# Patient Record
Sex: MALE | Race: WHITE | Employment: UNEMPLOYED | URBAN - METROPOLITAN AREA
[De-identification: names, ages, dates, MRNs, and addresses within clinical notes are randomized per-mention and may not be internally consistent; named-entity substitution may affect disease eponyms.]

---

## 2017-06-25 ENCOUNTER — APPOINTMENT (OUTPATIENT)
Dept: GENERAL RADIOLOGY | Age: 37
End: 2017-06-25
Attending: EMERGENCY MEDICINE
Payer: SUBSIDIZED

## 2017-06-25 ENCOUNTER — HOSPITAL ENCOUNTER (EMERGENCY)
Age: 37
Discharge: HOME | End: 2017-06-25
Attending: EMERGENCY MEDICINE
Payer: SUBSIDIZED

## 2017-06-25 ENCOUNTER — APPOINTMENT (OUTPATIENT)
Dept: CT IMAGING | Age: 37
End: 2017-06-25
Attending: EMERGENCY MEDICINE
Payer: SUBSIDIZED

## 2017-06-25 VITALS
RESPIRATION RATE: 20 BRPM | DIASTOLIC BLOOD PRESSURE: 78 MMHG | TEMPERATURE: 98.8 F | SYSTOLIC BLOOD PRESSURE: 167 MMHG | HEART RATE: 100 BPM | BODY MASS INDEX: 21 KG/M2 | OXYGEN SATURATION: 96 % | HEIGHT: 71 IN | WEIGHT: 150 LBS

## 2017-06-25 DIAGNOSIS — S20.212A RIB CONTUSION, LEFT, INITIAL ENCOUNTER: ICD-10-CM

## 2017-06-25 DIAGNOSIS — S00.81XA FACIAL ABRASION, INITIAL ENCOUNTER: ICD-10-CM

## 2017-06-25 DIAGNOSIS — S00.83XA FACIAL CONTUSION, INITIAL ENCOUNTER: ICD-10-CM
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
## 2017-06-25 PROCEDURE — 99283 EMERGENCY DEPT VISIT LOW MDM: CPT

## 2017-06-25 PROCEDURE — 70486 CT MAXILLOFACIAL W/O DYE: CPT

## 2017-06-25 PROCEDURE — 74011250637 HC RX REV CODE- 250/637: Performed by: EMERGENCY MEDICINE

## 2017-06-25 PROCEDURE — 71101 X-RAY EXAM UNILAT RIBS/CHEST: CPT

## 2017-06-25 PROCEDURE — 70450 CT HEAD/BRAIN W/O DYE: CPT

## 2017-06-25 RX ORDER — HYDROCODONE BITARTRATE AND ACETAMINOPHEN 5; 325 MG/1; MG/1
1 TABLET ORAL
Status: COMPLETED | OUTPATIENT
Start: 2017-06-25 | End: 2017-06-25

## 2017-06-25 RX ADMIN — HYDROCODONE BITARTRATE AND ACETAMINOPHEN 1 TABLET: 5; 325 TABLET ORAL at 22:33
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
## 2018-01-04 ENCOUNTER — APPOINTMENT (OUTPATIENT)
Dept: CT IMAGING | Age: 38
End: 2018-01-04
Attending: NURSE PRACTITIONER
Payer: SUBSIDIZED

## 2018-01-04 ENCOUNTER — HOSPITAL ENCOUNTER (INPATIENT)
Age: 38
LOS: 1 days | Discharge: HOME | DRG: 086 | End: 2018-01-05
Attending: FAMILY MEDICINE | Admitting: INTERNAL MEDICINE
Payer: SUBSIDIZED

## 2018-01-04 ENCOUNTER — APPOINTMENT (OUTPATIENT)
Dept: GENERAL RADIOLOGY | Age: 38
End: 2018-01-04
Attending: NURSE PRACTITIONER
Payer: SUBSIDIZED

## 2018-01-04 ENCOUNTER — HOSPITAL ENCOUNTER (EMERGENCY)
Age: 38
Discharge: OTHER INSTITUTION | End: 2018-01-04
Attending: EMERGENCY MEDICINE | Admitting: EMERGENCY MEDICINE
Payer: SUBSIDIZED

## 2018-01-04 VITALS
DIASTOLIC BLOOD PRESSURE: 77 MMHG | SYSTOLIC BLOOD PRESSURE: 147 MMHG | HEART RATE: 77 BPM | WEIGHT: 155 LBS | TEMPERATURE: 97.9 F | OXYGEN SATURATION: 94 % | HEIGHT: 71 IN | RESPIRATION RATE: 14 BRPM | BODY MASS INDEX: 21.7 KG/M2

## 2018-01-04 DIAGNOSIS — S06.319A INTRAPARENCHYMAL HEMATOMA OF BRAIN, RIGHT, WITH LOSS OF CONSCIOUSNESS, INITIAL ENCOUNTER (HCC): Primary | ICD-10-CM

## 2018-01-04 DIAGNOSIS — S06.350A TRAUMATIC HEMORRHAGE OF LEFT CEREBRUM WITHOUT LOSS OF CONSCIOUSNESS, INITIAL ENCOUNTER (HCC): Primary | ICD-10-CM

## 2018-01-04 PROCEDURE — 74011250636 HC RX REV CODE- 250/636: Performed by: NURSE PRACTITIONER

## 2018-01-04 PROCEDURE — 65610000006 HC RM INTENSIVE CARE

## 2018-01-04 PROCEDURE — 71045 X-RAY EXAM CHEST 1 VIEW: CPT

## 2018-01-04 PROCEDURE — 77010033678 HC OXYGEN DAILY

## 2018-01-04 PROCEDURE — 99284 EMERGENCY DEPT VISIT MOD MDM: CPT

## 2018-01-04 PROCEDURE — 70450 CT HEAD/BRAIN W/O DYE: CPT

## 2018-01-04 PROCEDURE — 96374 THER/PROPH/DIAG INJ IV PUSH: CPT

## 2018-01-04 PROCEDURE — L3650 SO 8 ABD RESTRAINT PRE OTS: HCPCS

## 2018-01-04 PROCEDURE — 73030 X-RAY EXAM OF SHOULDER: CPT

## 2018-01-04 RX ORDER — MORPHINE SULFATE 2 MG/ML
4 INJECTION, SOLUTION INTRAMUSCULAR; INTRAVENOUS ONCE
Status: COMPLETED | OUTPATIENT
Start: 2018-01-04 | End: 2018-01-04

## 2018-01-04 RX ADMIN — MORPHINE SULFATE 4 MG: 2 INJECTION, SOLUTION INTRAMUSCULAR; INTRAVENOUS at 18:54

## 2018-01-04 NOTE — ED PROVIDER NOTES
HPI Comments: 42 y.o. male with no significant past medical history who presents from home for evaluation s/p fall. Pt was walking in his backyard when he slipped and fell. He attempted to brace himself with a small tree which was unable to support his weight causing him to fall head first hitting his head on the tree. He is experiencing pain to the R-side of his head and neck and extreme pain to his L-shoulder. He took some Aleve PM and went to sleep but woke up with more severe pain. He also reports that he has shooting back pain with pleuritic movement. He does not think he experienced LOC but he did hit his head and \"laid down for awhile\" after the fall. Pt denies use of blood thinners. Pt denies SOB. There are no other acute medical concerns at this time. Social hx: current everyday tobacco smoker (0.25 packs/day); no EtOH use; no illicit drug use    History reviewed. No pertinent past medical history. Past Surgical History:  No date: HX HEENT      Comment: tooth extraction    Note written by Isis Siddiqui, as dictated by Malathi Orosco NP 4:46 PM    The history is provided by the patient. No  was used. History reviewed. No pertinent past medical history. Past Surgical History:   Procedure Laterality Date    HX HEENT      tooth extraction         History reviewed. No pertinent family history. Social History     Social History    Marital status: SINGLE     Spouse name: N/A    Number of children: N/A    Years of education: N/A     Occupational History    Not on file. Social History Main Topics    Smoking status: Current Every Day Smoker     Packs/day: 0.50    Smokeless tobacco: Never Used    Alcohol use Yes    Drug use: No    Sexual activity: Not on file     Other Topics Concern    Not on file     Social History Narrative         ALLERGIES: Review of patient's allergies indicates no known allergies. Review of Systems   Constitutional: Negative. Negative for activity change, appetite change, chills, fatigue, fever and unexpected weight change. HENT: Negative. Negative for congestion, ear discharge, ear pain, hearing loss, nosebleeds, rhinorrhea, sinus pain, sinus pressure, sore throat and trouble swallowing. Eyes: Negative for photophobia, pain, redness, itching and visual disturbance. Respiratory: Negative. Negative for cough, chest tightness and shortness of breath. Cardiovascular: Negative. Negative for chest pain and palpitations. Gastrointestinal: Negative. Negative for abdominal distention, abdominal pain, blood in stool, nausea and vomiting. Endocrine: Negative. Genitourinary: Negative for difficulty urinating, dysuria, frequency, hematuria and urgency. Musculoskeletal: Positive for arthralgias (R-shoulder pain) and neck pain. Negative for back pain, myalgias and neck stiffness. Skin: Negative. Negative for color change, pallor, rash and wound. Allergic/Immunologic: Negative. Neurological: Positive for headaches. Negative for dizziness, syncope, weakness and numbness. Hematological: Negative. Does not bruise/bleed easily. Psychiatric/Behavioral: Negative. Negative for behavioral problems. The patient is not nervous/anxious. All other systems reviewed and are negative. Vitals:    01/04/18 1627   BP: 147/77   Pulse: (!) 124   Resp: 24   Temp: 97.9 °F (36.6 °C)   SpO2: 100%   Weight: 70.3 kg (155 lb)   Height: 5' 11\" (1.803 m)            Physical Exam   Constitutional: He is oriented to person, place, and time. He appears well-developed and well-nourished. He appears distressed (in pain). HENT:   Head: Normocephalic and atraumatic. Right Ear: External ear normal.   Left Ear: External ear normal.   Nose: Nose normal.   Mouth/Throat: Oropharynx is clear and moist.   Eyes: Conjunctivae and EOM are normal. Pupils are equal, round, and reactive to light. Right eye exhibits no discharge.  Left eye exhibits no discharge. Neck: Normal range of motion. Neck supple. No JVD present. No tracheal deviation present. Cardiovascular: Regular rhythm, normal heart sounds and intact distal pulses. Exam reveals no gallop. No murmur heard. Tachycardia 120's   Pulmonary/Chest: Effort normal and breath sounds normal. No respiratory distress. He has no wheezes. He has no rales. He exhibits no tenderness. Abdominal: Soft. Bowel sounds are normal. He exhibits no distension. There is no tenderness. There is no rebound and no guarding. Genitourinary:   Genitourinary Comments: Negative     Musculoskeletal: Normal range of motion. He exhibits tenderness (left shoulder). He exhibits no edema. Neurological: He is alert and oriented to person, place, and time. Skin: Skin is warm and dry. No rash noted. No erythema. No pallor. Psychiatric: His behavior is normal. Judgment and thought content normal.   Patient with anxiety   Nursing note and vitals reviewed. MDM  Number of Diagnoses or Management Options  Intraparenchymal hematoma of brain, right, with loss of consciousness, initial encounter [facility]): new and requires workup  Diagnosis management comments: Plan:  Transfer to [facility] for observation. Patient with IPH post fall. Amount and/or Complexity of Data Reviewed  Tests in the radiology section of CPT®: ordered and reviewed      ED Course   1720: Reviewed radiology. CT head reviewed with Dr. Donato Ferrara. 1800: Spoke with Dr. Rukhsana Nelson of neurosurgery [facility]. Patient needs to be admitted to the hospital overnight, repeat head CT in AM. Will contact hospitalist.   Angelita Melton: Awaiting call for hospitalist.   Jenny Garcia: Spoke with Dr. Saralyn Cheadle regarding admission to hospital.  Dr. Saralyn Cheadle to accept patient.      Procedures

## 2018-01-04 NOTE — ED TRIAGE NOTES
Pt c/o falling headfirst into a tree this morning. Pt states hit his left shoulder. No LOC. Pt having extreme pain in shoulder and states has a bump. States it hurts to breath in his back.  2+ left radial pulse

## 2018-01-04 NOTE — IP AVS SNAPSHOT
[address] 
[phone] Patient: Anton Yoder MRN: [id number] :1980 About your hospitalization You were admitted on:  2018 You last received care in the:  [address] [place] [address] You were discharged on:  2018 Why you were hospitalized Your primary diagnosis was:  Traumatic Hemorrhage Of Cerebrum (Hcc) Your diagnoses also included:  Ich (Intracerebral Hemorrhage) (Hcc) Follow-up Information Follow up With Details Comments Contact Info Michael Johns MD (Jody) In 2 weeks  [address] [facility] [address] 
[phone] None   None (395) Patient stated that they have no PCP Hazel) Jeff Johns MD In 2 weeks f/u for left [facility] joint separation [address] [facility] [address] 
[phone] Discharge Orders None A check miguel indicates which time of day the medication should be taken. My Medications START taking these medications Instructions Each Dose to Equal  
 Morning Noon Evening Bedtime  
 nicotine 21 mg/24 hr  
Commonly known as:  Herlene Bilberry Start taking on:  2018 Your last dose was: Your next dose is:    
   
   
 1 Patch by TransDERmal route every twenty-four (24) hours for 30 days. 1 Patch  
    
   
   
   
  
 oxyCODONE-acetaminophen 5-325 mg per tablet Commonly known as:  PERCOCET Your last dose was: Your next dose is: Take 1 Tab by mouth every four (4) hours as needed for Pain. Max Daily Amount: 6 Tabs. 1 Tab Where to Get Your Medications Information on where to get these meds will be given to you by the nurse or doctor. ! Ask your nurse or doctor about these medications  
  nicotine 21 mg/24 hr  
 oxyCODONE-acetaminophen 5-325 mg per tablet Discharge Instructions Shoulder Separation: Care Instructions Your Care Instructions A shoulder separation is a tearing of the ligaments that connect two bones of the shoulder-the collarbone (clavicle) and the end of the shoulder blade (acromion). The ligaments can be partially or completely torn. This is usually caused by a blow to the top of the shoulder or a fall onto an outstretched arm. Shoulder injuries can be slow to heal, but with time and effort, your shoulder should get better. Physical therapy can help you regain strength, motion, and flexibility in your shoulder. Follow-up care is a key part of your treatment and safety. Be sure to make and go to all appointments, and call your doctor if you are having problems. It's also a good idea to know your test results and keep a list of the medicines you take. How can you care for yourself at home? · If your doctor put your arm in a sling, wear the sling as directed. Do not take it off before your doctor tells you to. · Take pain medicines exactly as directed. ¨ If the doctor gave you a prescription medicine for pain, take it as prescribed. ¨ If you are not taking a prescription pain medicine, ask your doctor if you can take an over-the-counter medicine. · Rest your shoulder as much as you can. · Put ice or a cold pack on your shoulder for 10 to 20 minutes at a time. Try to do this every 1 to 2 hours for the next 3 days (when you are awake) or until the swelling goes down. Put a thin cloth between the ice and your skin. · You may use warm packs after the first 3 days for 15 to 20 minutes at a time to ease pain. · Do not do anything that makes pain worse. · Go to all follow-up appointments. You and your doctor will decide if you need further treatment, including surgery. You and your doctor will also decide when to begin physical therapy, if it is needed. When should you call for help? Call your doctor now or seek immediate medical care if: 
? · Your pain gets a lot worse. ? · You cannot move your arm. ? · You have new weakness, numbness, or tingling in your hand or arm. ? · Your arm or hand is cool or pale or changes color. ? · Your sling feels too tight, and you cannot loosen it. ? Watch closely for changes in your health, and be sure to contact your doctor if: 
? · You have new or increased swelling in your arm. ? · You have new pain that develops in another area of your arm. For example, you have pain in your hand or elbow. ? · You do not get better as expected. Where can you learn more? Go to http://marlene-maira.info/. Enter S051 in the search box to learn more about \"Shoulder Separation: Care Instructions. \" Current as of: March 21, 2017 Content Version: 11.4 © 2881-6813 [employer]. Care instructions adapted under license by [employer] (which disclaims liability or warranty for this information). If you have questions about a medical condition or this instruction, always ask your healthcare professional. [place] [address] any warranty or liability for your use of this information. [facility] Announcement We are excited to announce that we are making your provider's discharge notes available to you in [facility]. You will see these notes when they are completed and signed by the physician that discharged you from your recent hospital stay. If you have any questions or concerns about any information you see in [facility], please call the Health Information Department where you were seen or reach out to your Primary Care Provider for more information about your plan of care. Introducing [facility]! Coreen Ross introduces [facility] patient portal. Now you can access parts of your medical record, email your doctor's office, and request medication refills online. 1. In your internet browser, go to [email or web address] 2. Click on the First Time User? Click Here link in the Sign In box. You will see the New Member Sign Up page. 3. Enter your [employer] Access Code exactly as it appears below. You will not need to use this code after youve completed the sign-up process. If you do not sign up before the expiration date, you must request a new code. · [employer] Access Code: [email or web address] Expires: 4/4/2018  6:42 PM 
 
4. Enter the last four digits of your Social Security Number (xxxx) and Date of Birth (mm/dd/yyyy) as indicated and click Submit. You will be taken to the next sign-up page. 5. Create a Imaginatikt ID. This will be your [employer] login ID and cannot be changed, so think of one that is secure and easy to remember. 6. Create a [employer] password. You can change your password at any time. 7. Enter your Password Reset Question and Answer. This can be used at a later time if you forget your password. 8. Enter your e-mail address. You will receive e-mail notification when new information is available in [address] 9. Click Sign Up. You can now view and download portions of your medical record. 10. Click the Download Summary menu link to download a portable copy of your medical information. If you have questions, please visit the Frequently Asked Questions section of the [employer] website. Remember, [employer] is NOT to be used for urgent needs. For medical emergencies, dial 911. Now available from your iPhone and Android! Providers Seen During Your Hospitalization Provider Specialty Primary office phone Rachael Don MD Hospitalist [phone] Patrica Porter MD Family Practice [phone] Your Primary Care Physician (PCP) Primary Care Physician Office Phone Office Fax NONE ** None ** ** None ** You are allergic to the following No active allergies Recent Documentation Height Weight BMI Smoking Status 1.803 m 72 kg 22.14 kg/m2 Current Every Day Smoker Emergency Contacts Name Discharge Info Relation Home Work Mobile [facility] DISCHARGE CAREGIVER [3] Sister [23] [phone] Patient Belongings The following personal items are in your possession at time of discharge: 
  Dental Appliances: None  Visual Aid: None      Home Medications: None   Jewelry: None  Clothing: None    Other Valuables: None Please provide this summary of care documentation to your next provider. Signatures-by signing, you are acknowledging that this After Visit Summary has been reviewed with you and you have received a copy. Patient Signature:  ____________________________________________________________ Date:  ____________________________________________________________  
  
[facility] Provider Signature:  ____________________________________________________________ Date:  ____________________________________________________________

## 2018-01-05 ENCOUNTER — APPOINTMENT (OUTPATIENT)
Dept: CT IMAGING | Age: 38
DRG: 086 | End: 2018-01-05
Attending: INTERNAL MEDICINE
Payer: SUBSIDIZED

## 2018-01-05 VITALS
HEIGHT: 71 IN | DIASTOLIC BLOOD PRESSURE: 86 MMHG | SYSTOLIC BLOOD PRESSURE: 136 MMHG | TEMPERATURE: 97.9 F | WEIGHT: 158.73 LBS | OXYGEN SATURATION: 96 % | BODY MASS INDEX: 22.22 KG/M2 | HEART RATE: 94 BPM | RESPIRATION RATE: 17 BRPM

## 2018-01-05 PROBLEM — S06.36AA TRAUMATIC HEMORRHAGE OF CEREBRUM: Status: ACTIVE | Noted: 2018-01-05

## 2018-01-05 PROBLEM — I61.9 ICH (INTRACEREBRAL HEMORRHAGE) (HCC): Status: ACTIVE | Noted: 2018-01-05

## 2018-01-05 LAB
ALBUMIN SERPL-MCNC: 4 G/DL (ref 3.5–5)
ALBUMIN/GLOB SERPL: 1.1 {RATIO} (ref 1.1–2.2)
ALP SERPL-CCNC: 41 U/L (ref 45–117)
ALT SERPL-CCNC: 75 U/L (ref 12–78)
AMPHET UR QL SCN: NEGATIVE
ANION GAP SERPL CALC-SCNC: 10 MMOL/L (ref 5–15)
AST SERPL-CCNC: 32 U/L (ref 15–37)
BARBITURATES UR QL SCN: NEGATIVE
BASOPHILS # BLD: 0 K/UL (ref 0–0.1)
BASOPHILS NFR BLD: 0 % (ref 0–1)
BENZODIAZ UR QL: NEGATIVE
BILIRUB SERPL-MCNC: 0.8 MG/DL (ref 0.2–1)
BUN SERPL-MCNC: 13 MG/DL (ref 6–20)
BUN/CREAT SERPL: 14 (ref 12–20)
CALCIUM SERPL-MCNC: 9 MG/DL (ref 8.5–10.1)
CANNABINOIDS UR QL SCN: POSITIVE
CHLORIDE SERPL-SCNC: 104 MMOL/L (ref 97–108)
CO2 SERPL-SCNC: 25 MMOL/L (ref 21–32)
COCAINE UR QL SCN: NEGATIVE
CREAT SERPL-MCNC: 0.94 MG/DL (ref 0.7–1.3)
DRUG SCRN COMMENT,DRGCM: ABNORMAL
EOSINOPHIL # BLD: 0.1 K/UL (ref 0–0.4)
EOSINOPHIL NFR BLD: 1 % (ref 0–7)
ERYTHROCYTE [DISTWIDTH] IN BLOOD BY AUTOMATED COUNT: 12.4 % (ref 11.5–14.5)
GLOBULIN SER CALC-MCNC: 3.6 G/DL (ref 2–4)
GLUCOSE SERPL-MCNC: 91 MG/DL (ref 65–100)
HCT VFR BLD AUTO: 40.4 % (ref 36.6–50.3)
HGB BLD-MCNC: 14.2 G/DL (ref 12.1–17)
LYMPHOCYTES # BLD: 3 K/UL (ref 0.8–3.5)
LYMPHOCYTES NFR BLD: 44 % (ref 12–49)
MAGNESIUM SERPL-MCNC: 1.9 MG/DL (ref 1.6–2.4)
MCH RBC QN AUTO: 32.5 PG (ref 26–34)
MCHC RBC AUTO-ENTMCNC: 35.1 G/DL (ref 30–36.5)
MCV RBC AUTO: 92.4 FL (ref 80–99)
METHADONE UR QL: NEGATIVE
MONOCYTES # BLD: 0.4 K/UL (ref 0–1)
MONOCYTES NFR BLD: 7 % (ref 5–13)
NEUTS SEG # BLD: 3.3 K/UL (ref 1.8–8)
NEUTS SEG NFR BLD: 48 % (ref 32–75)
OPIATES UR QL: POSITIVE
PCP UR QL: NEGATIVE
PHOSPHATE SERPL-MCNC: 4.9 MG/DL (ref 2.6–4.7)
PLATELET # BLD AUTO: 232 K/UL (ref 150–400)
POTASSIUM SERPL-SCNC: 3.9 MMOL/L (ref 3.5–5.1)
PROT SERPL-MCNC: 7.6 G/DL (ref 6.4–8.2)
RBC # BLD AUTO: 4.37 M/UL (ref 4.1–5.7)
SODIUM SERPL-SCNC: 139 MMOL/L (ref 136–145)
WBC # BLD AUTO: 6.8 K/UL (ref 4.1–11.1)

## 2018-01-05 PROCEDURE — 74011250636 HC RX REV CODE- 250/636: Performed by: INTERNAL MEDICINE

## 2018-01-05 PROCEDURE — 83735 ASSAY OF MAGNESIUM: CPT | Performed by: INTERNAL MEDICINE

## 2018-01-05 PROCEDURE — 84100 ASSAY OF PHOSPHORUS: CPT | Performed by: INTERNAL MEDICINE

## 2018-01-05 PROCEDURE — 74011250637 HC RX REV CODE- 250/637: Performed by: INTERNAL MEDICINE

## 2018-01-05 PROCEDURE — 80053 COMPREHEN METABOLIC PANEL: CPT | Performed by: INTERNAL MEDICINE

## 2018-01-05 PROCEDURE — 36415 COLL VENOUS BLD VENIPUNCTURE: CPT | Performed by: INTERNAL MEDICINE

## 2018-01-05 PROCEDURE — 80307 DRUG TEST PRSMV CHEM ANLYZR: CPT | Performed by: INTERNAL MEDICINE

## 2018-01-05 PROCEDURE — 77010033678 HC OXYGEN DAILY

## 2018-01-05 PROCEDURE — 85025 COMPLETE CBC W/AUTO DIFF WBC: CPT | Performed by: INTERNAL MEDICINE

## 2018-01-05 PROCEDURE — 72125 CT NECK SPINE W/O DYE: CPT

## 2018-01-05 PROCEDURE — 70450 CT HEAD/BRAIN W/O DYE: CPT

## 2018-01-05 RX ORDER — SODIUM CHLORIDE 0.9 % (FLUSH) 0.9 %
5 SYRINGE (ML) INJECTION AS NEEDED
Status: DISCONTINUED | OUTPATIENT
Start: 2018-01-05 | End: 2018-01-05 | Stop reason: HOSPADM

## 2018-01-05 RX ORDER — IBUPROFEN 200 MG
1 TABLET ORAL EVERY 24 HOURS
Status: DISCONTINUED | OUTPATIENT
Start: 2018-01-05 | End: 2018-01-05 | Stop reason: HOSPADM

## 2018-01-05 RX ORDER — DOCUSATE SODIUM 100 MG/1
100 CAPSULE, LIQUID FILLED ORAL 2 TIMES DAILY
Status: DISCONTINUED | OUTPATIENT
Start: 2018-01-05 | End: 2018-01-05 | Stop reason: HOSPADM

## 2018-01-05 RX ORDER — SODIUM CHLORIDE 0.9 % (FLUSH) 0.9 %
SYRINGE (ML) INJECTION
Status: COMPLETED
Start: 2018-01-05 | End: 2018-01-05

## 2018-01-05 RX ORDER — SODIUM CHLORIDE 9 MG/ML
75 INJECTION, SOLUTION INTRAVENOUS CONTINUOUS
Status: DISCONTINUED | OUTPATIENT
Start: 2018-01-05 | End: 2018-01-05 | Stop reason: HOSPADM

## 2018-01-05 RX ORDER — ACETAMINOPHEN 325 MG/1
650 TABLET ORAL
Status: DISCONTINUED | OUTPATIENT
Start: 2018-01-05 | End: 2018-01-05 | Stop reason: HOSPADM

## 2018-01-05 RX ORDER — ONDANSETRON 2 MG/ML
4 INJECTION INTRAMUSCULAR; INTRAVENOUS
Status: DISCONTINUED | OUTPATIENT
Start: 2018-01-05 | End: 2018-01-05 | Stop reason: HOSPADM

## 2018-01-05 RX ORDER — HYDROCODONE BITARTRATE AND ACETAMINOPHEN 5; 325 MG/1; MG/1
1 TABLET ORAL
Status: DISCONTINUED | OUTPATIENT
Start: 2018-01-05 | End: 2018-01-05 | Stop reason: HOSPADM

## 2018-01-05 RX ORDER — HYDROCODONE BITARTRATE AND ACETAMINOPHEN 5; 325 MG/1; MG/1
TABLET ORAL
Status: DISCONTINUED
Start: 2018-01-05 | End: 2018-01-05 | Stop reason: HOSPADM

## 2018-01-05 RX ORDER — IBUPROFEN 200 MG
1 TABLET ORAL EVERY 24 HOURS
Qty: 30 PATCH | Refills: 0 | Status: SHIPPED | OUTPATIENT
Start: 2018-01-06 | End: 2018-02-05

## 2018-01-05 RX ORDER — OXYCODONE AND ACETAMINOPHEN 5; 325 MG/1; MG/1
1 TABLET ORAL
Qty: 20 TAB | Refills: 0 | Status: SHIPPED | OUTPATIENT
Start: 2018-01-05

## 2018-01-05 RX ADMIN — HYDROCODONE BITARTRATE AND ACETAMINOPHEN 1 TABLET: 5; 325 TABLET ORAL at 04:57

## 2018-01-05 RX ADMIN — HYDROCODONE BITARTRATE AND ACETAMINOPHEN 1 TABLET: 5; 325 TABLET ORAL at 01:03

## 2018-01-05 RX ADMIN — Medication 10 ML: at 04:57

## 2018-01-05 RX ADMIN — SODIUM CHLORIDE 75 ML/HR: 900 INJECTION, SOLUTION INTRAVENOUS at 01:03

## 2018-01-05 RX ADMIN — HYDROCODONE BITARTRATE AND ACETAMINOPHEN 1 TABLET: 5; 325 TABLET ORAL at 09:01

## 2018-01-05 NOTE — ED NOTES
TRANSFER - OUT REPORT:    Verbal report given to [employer] on Silverio Ewing  being transferred to ICU for routine progression of care       Report consisted of patients Situation, Background, Assessment and   Recommendations(SBAR). Information from the following report(s) SBAR, Kardex, ED Summary and Recent Results was reviewed with the receiving nurse. Lines:   Peripheral IV 01/04/18 Right Antecubital (Active)   Site Assessment Clean, dry, & intact 1/4/2018  6:56 PM   Phlebitis Assessment 0 1/4/2018  6:56 PM   Infiltration Assessment 0 1/4/2018  6:56 PM   Dressing Status Clean, dry, & intact 1/4/2018  6:56 PM   Hub Color/Line Status Blue;Flushed 1/4/2018  6:56 PM        Opportunity for questions and clarification was provided.       Patient transported with:   Registered Nurse

## 2018-01-05 NOTE — PROGRESS NOTES
Reached out to patient to scheduled follow up PCP appointment with Marley Munroe. for Monday, 1/16/18 at 1:00 p.m. patient declined f/u appointment stating he has to choose taking care of himself as well as his son. Explained the importance of having a primary care physician for health and wellness. Patient hung up.   Maria G Amato, Care Management Specialist.

## 2018-01-05 NOTE — ROUTINE PROCESS
2000. TRANSFER - OUT REPORT:    Verbal report given to Leo Carrera RN (name) on Ingrid Jones  being transferred to [facility] (unit) for routine progression of care       Report consisted of patients Situation, Background, Assessment and   Recommendations(SBAR). Information from the following report(s) SBAR, Kardex, ED Summary, Procedure Summary, Intake/Output, MAR, Accordion, Recent Results, Med Rec Status, Cardiac Rhythm ST/SR and Alarm Parameters  was reviewed with the receiving nurse. Lines:   Peripheral IV 01/04/18 Right Antecubital (Active)   Site Assessment Clean, dry, & intact 1/4/2018  9:30 PM   Phlebitis Assessment 0 1/4/2018  9:30 PM   Infiltration Assessment 0 1/4/2018  9:30 PM   Dressing Status Clean, dry, & intact 1/4/2018  9:30 PM   Dressing Type Tape;Transparent 1/4/2018  9:30 PM   Hub Color/Line Status Blue;Capped;Flushed 1/4/2018  9:30 PM   Action Taken Open ports on tubing capped 1/4/2018  9:30 PM   Alcohol Cap Used Yes 1/4/2018  9:30 PM        Opportunity for questions and clarification was provided. Patient transported with:   Monitor  Registered Nurse          0730. Bedside, Verbal and Written shift change report given to Santi Lopes (oncoming nurse) by Leo Carrera RN (offgoing nurse). Report included the following information SBAR, Kardex, ED Summary, Procedure Summary, Intake/Output, MAR, Accordion, Recent Results, Med Rec Status, Cardiac Rhythm NSR and Alarm Parameters .

## 2018-01-05 NOTE — DISCHARGE INSTRUCTIONS
Shoulder Separation: Care Instructions  Your Care Instructions    A shoulder separation is a tearing of the ligaments that connect two bones of the shoulder-the collarbone (clavicle) and the end of the shoulder blade (acromion). The ligaments can be partially or completely torn. This is usually caused by a blow to the top of the shoulder or a fall onto an outstretched arm. Shoulder injuries can be slow to heal, but with time and effort, your shoulder should get better. Physical therapy can help you regain strength, motion, and flexibility in your shoulder. Follow-up care is a key part of your treatment and safety. Be sure to make and go to all appointments, and call your doctor if you are having problems. It's also a good idea to know your test results and keep a list of the medicines you take. How can you care for yourself at home? · If your doctor put your arm in a sling, wear the sling as directed. Do not take it off before your doctor tells you to. · Take pain medicines exactly as directed. ¨ If the doctor gave you a prescription medicine for pain, take it as prescribed. ¨ If you are not taking a prescription pain medicine, ask your doctor if you can take an over-the-counter medicine. · Rest your shoulder as much as you can. · Put ice or a cold pack on your shoulder for 10 to 20 minutes at a time. Try to do this every 1 to 2 hours for the next 3 days (when you are awake) or until the swelling goes down. Put a thin cloth between the ice and your skin. · You may use warm packs after the first 3 days for 15 to 20 minutes at a time to ease pain. · Do not do anything that makes pain worse. · Go to all follow-up appointments. You and your doctor will decide if you need further treatment, including surgery. You and your doctor will also decide when to begin physical therapy, if it is needed. When should you call for help?   Call your doctor now or seek immediate medical care if:  ? · Your pain gets a lot worse. ? · You cannot move your arm. ? · You have new weakness, numbness, or tingling in your hand or arm. ? · Your arm or hand is cool or pale or changes color. ? · Your sling feels too tight, and you cannot loosen it. ? Watch closely for changes in your health, and be sure to contact your doctor if:  ? · You have new or increased swelling in your arm. ? · You have new pain that develops in another area of your arm. For example, you have pain in your hand or elbow. ? · You do not get better as expected. Where can you learn more? Go to http://marlene-maira.info/. Enter S051 in the search box to learn more about \"Shoulder Separation: Care Instructions. \"  Current as of: March 21, 2017  Content Version: 11.4  © 1505-9721 [employer]. Care instructions adapted under license by [employer] (which disclaims liability or warranty for this information). If you have questions about a medical condition or this instruction, always ask your healthcare professional. [address] any warranty or liability for your use of this information.

## 2018-01-05 NOTE — DISCHARGE SUMMARY
Discharge Summary       PATIENT ID: Thong Guillory  MRN: [id number]   YOB: 1980    DATE OF ADMISSION: 1/4/2018  9:23 PM    DATE OF DISCHARGE: 1/5/2018   PRIMARY CARE PROVIDER: None     ATTENDING PHYSICIAN: Tika Brownlee  DISCHARGING PROVIDER: Fatmata Davenport MD    To contact this individual call [phone] and ask the  to page. If unavailable ask to be transferred the Adult Hospitalist Department. CONSULTATIONS: IP CONSULT TO ORTHOPEDIC SURGERY    PROCEDURES/SURGERIES: * No surgery found *    ADMITTING DIAGNOSES & HOSPITAL COURSE:      This is a 75-year-old man without any significant past medical history, who was in his usual state of health until the day of presentation to the emergency room, when the patient fell in his backyard. The patient slipped and fell. The fall was accidental.  There was no dizziness, chest pain or blurring of vision before the fall. Patient sustained injuries to the right side of his head and neck, as well as his left shoulder. Patient denies loss of consciousness. Patient was able to ambulate without any problem following the fall. He took Aleve for the associated pain. Patient was trying to sleep, but could not because of the pain. He went to [facility] Emergency Room. When the patient arrived to the emergency room, CT scan of the head was obtained. The CT scan shows possible intracerebral hemorrhage. Patient was discussed with the neurosurgeon on-call at [facility].  The neurosurgeon advised admission to the hospitalist service. Patient was directly accepted from [facility] to the hospitalist service here at [facility] for admission. Patient denies fever, rigors and chills.     Hospital Course    1. Intracranial hemorrhage  Patient presents with fall and sustained head injury and small left cerebral hemorrhage. He also sustained transverse process fracture of C7 and T1.  Neurosurgery evaluated the patient and clear for discharge without the need for follow up. 2. Left AC joint separation  Also secondary to trauma from fall. Ortho evaluated the patient and recommended arm sling, non weight bearing of the left arm and follow up with ortho in 2 weeks. No surgical intervention recommended. DISCHARGE DIAGNOSES / PLAN:      1. Intracranial hemorrhage  2. Fall  3. Left AC joint sepration       PENDING TEST RESULTS:   At the time of discharge the following test results are still pending: None    FOLLOW UP APPOINTMENTS:    Follow-up Information     Follow up With Details Comments Contact Info    Michael Pappas MD (Jody) In 2 weeks  [address] [address]  [phone]      None   None (003) Patient stated that they have no PCP      Michael Pappas MD (Jody) In 2 weeks f/u for left [facility] joint separation [address] [facility] [address]  [phone]             ADDITIONAL CARE RECOMMENDATIONS: None    DIET: Regular Diet  Oral Nutritional Supplements: No Oral Supplement prescribed    ACTIVITY: Non weight bearing left arm    WOUND CARE: None    EQUIPMENT needed: None      DISCHARGE MEDICATIONS:  Current Discharge Medication List      START taking these medications    Details   nicotine (NICODERM CQ) 21 mg/24 hr 1 Patch by TransDERmal route every twenty-four (24) hours for 30 days. Qty: 30 Patch, Refills: 0      oxyCODONE-acetaminophen (PERCOCET) 5-325 mg per tablet Take 1 Tab by mouth every four (4) hours as needed for Pain. Max Daily Amount: 6 Tabs. Qty: 20 Tab, Refills: 0    Associated Diagnoses: Traumatic hemorrhage of left cerebrum without loss of consciousness, initial encounter ([facility] [address]               NOTIFY YOUR PHYSICIAN FOR ANY OF THE FOLLOWING:   Fever over 101 degrees for 24 hours. Chest pain, shortness of breath, fever, chills, nausea, vomiting, diarrhea, change in mentation, falling, weakness, bleeding. Severe pain or pain not relieved by medications. Or, any other signs or symptoms that you may have questions about. DISPOSITION:   * Home With:   OT  PT  HH  RN       Long term SNF/Inpatient Rehab    Independent/assisted living    Hospice    Other:       PATIENT CONDITION AT DISCHARGE:     Functional status    Poor     Deconditioned    * Independent      Cognition   *  Lucid     Forgetful     Dementia      Catheters/lines (plus indication)    Rodriguez     PICC     PEG    * None      Code status   *  Full code     DNR      PHYSICAL EXAMINATION AT DISCHARGE:     The physical exam is generally normal. He appears well, alert and oriented x 3, pleasant and cooperative. Vitals as noted. ENT normal, neck supple and free of adenopathy, or masses. No thyromegaly or carotid bruits. Cranial nerves and fundi normal. Lungs are clear to auscultation. Heart sounds are normal, no murmurs, clicks, gallops or rubs. Abdomen is soft, no tenderness, masses or organomegaly. Extremities, peripheral pulses and reflexes are normal. Screening neurological exam is normal without focal findings. Skin is normal without suspicious lesions.       CHRONIC MEDICAL DIAGNOSES:  Problem List as of 1/5/2018  Date Reviewed: 1/5/2018          Codes Class Noted - Resolved    * (Principal)Traumatic hemorrhage [facility] cerebrum ([facility] ICD-10-CM: T72.505H  ICD-9-CM: 853.00  1/5/2018 - Present        ICH (intracerebral hemorrhage) ([facility] ICD-10-CM: I61.9  ICD-9-CM: 741  1/5/2018 - Present              Greater than 30 minutes were spent with the patient on counseling and coordination of care    Signed:   Sandra Burciaga MD  1/5/2018  11:16 AM

## 2018-01-05 NOTE — CONSULTS
ORTHOPAEDIC CONSULT NOTE    Subjective:     Date of Consultation:  January 5, 2018  Referring Physician:  Corena Mcardle is a 42 y.o. male with no pertinent PMH who presented to the [facility] ED last night with complaints of head, neck and shoulder pain following a GLF yesterday. States he accidentally fell and tried to catch himself but ended up crashing into a tree. He tried using some NSAIDs with no relief and was seen in the ED and found to have a possible head bleed and transverse process fractures in the neck. He was transferred here for the brain bleed. Today he complains of severe left shoulder pain made worse with movement but alleviated with pain medications. He denies any tingling or numbness of his hands or arms. He denies any past injuries or issues with the shoulder. He was found to have an AC separation in the left shoulder and we have been asked to see the patient for this. Patient Active Problem List    Diagnosis Date Noted    Traumatic hemorrhage of cerebrum ([facility] [address] 01/05/2018    [facility]re[facility] 01/05/2018     No family history on file. Social History   Substance Use Topics    Smoking status: Current Every Day Smoker     Packs/day: 0.25    Smokeless tobacco: Never Used    Alcohol use No     No past medical history on file.    Past Surgical History:   Procedure Laterality Date    HX HEENT      tooth extraction      Prior to Admission medications    Not on File     Current Facility-Administered Medications   Medication Dose Route Frequency    0.9% sodium chloride infusion  75 mL/hr IntraVENous CONTINUOUS    acetaminophen (TYLENOL) tablet 650 mg  650 mg Oral Q4H PRN    HYDROcodone-acetaminophen (NORCO) 5-325 mg per tablet 1 Tab  1 Tab Oral Q4H PRN    ondansetron (ZOFRAN) injection 4 mg  4 mg IntraVENous Q4H PRN    docusate sodium (COLACE) capsule 100 mg  100 mg Oral BID    nicotine (NICODERM CQ) 21 mg/24 hr patch 1 Patch  1 Patch TransDERmal Q24H    saline peripheral flush soln 5 mL  5 mL InterCATHeter PRN    HYDROcodone-acetaminophen (NORCO) 5-325 mg per tablet          No Known Allergies     Review of Systems:  Pertinent items are noted in HPI.     Mental Status: no dementia    Objective:     Patient Vitals for the past 8 hrs:   BP Temp Pulse Resp SpO2   18 0900 136/86 - 94 17 96 %   18 0800 (!) 134/94 97.9 °F (36.6 °C) (!) 106 21 100 %   18 0600 122/81 - 68 12 92 %   18 0500 124/82 - 72 15 91 %   18 0400 125/82 97.9 °F (36.6 °C) 80 17 97 %   18 0300 115/79 - 86 21 95 %     Temp (24hrs), Av.8 °F (36.6 °C), Min:97.5 °F (36.4 °C), Max:98 °F (36.7 °C)      EXAM: Awake and alert lying in bed; appears agitated or nervous; agreeable to exam  Left arm in sling  Left shoulder with mild prominence noted over the [facility] joint and is quite TTP  Unable to range joint due to pain  Intact distal sensory function  5/5 strength for wrist extension and hand intrinsic function  Distal pulses palpable    Imaging Review: EXAM:  XR SHOULDER LT AP/LAT MIN 2 V     INDICATION:   Status post fall with acute left shoulder pain.     COMPARISON: None.     FINDINGS: Three views of the left shoulder demonstrate widening of the  acromioclavicular joint compatible with acromioclavicular joint separation.     IMPRESSION  IMPRESSION:  Grade 2 acromioclavicular joint separation    Labs:   Recent Results (from the past 24 hour(s))   METABOLIC PANEL, COMPREHENSIVE    Collection Time: 18  4:13 AM   Result Value Ref Range    Sodium 139 136 - 145 mmol/L    Potassium 3.9 3.5 - 5.1 mmol/L    Chloride 104 97 - 108 mmol/L    CO2 25 21 - 32 mmol/L    Anion gap 10 5 - 15 mmol/L    Glucose 91 65 - 100 mg/dL    BUN 13 6 - 20 MG/DL    Creatinine 0.94 0.70 - 1.30 MG/DL    BUN/Creatinine ratio 14 12 - 20      GFR est AA >60 >60 ml/min/1.73m2    GFR est non-AA >60 >60 ml/min/1.73m2    Calcium 9.0 8.5 - 10.1 MG/DL    Bilirubin, total 0.8 0.2 - 1.0 MG/DL    ALT (SGPT) 75 12 - 78 U/L    AST (SGOT) 32 15 - 37 U/L    Alk. phosphatase 41 (L) 45 - 117 U/L    Protein, total 7.6 6.4 - 8.2 g/dL    Albumin 4.0 3.5 - 5.0 g/dL    Globulin 3.6 2.0 - 4.0 g/dL    A-G Ratio 1.1 1.1 - 2.2     CBC WITH AUTOMATED DIFF    Collection Time: 01/05/18  4:13 AM   Result Value Ref Range    WBC 6.8 4.1 - 11.1 K/uL    RBC 4.37 4.10 - 5.70 M/uL    HGB 14.2 12.1 - 17.0 g/dL    HCT 40.4 36.6 - 50.3 %    MCV 92.4 80.0 - 99.0 FL    MCH 32.5 26.0 - 34.0 PG    MCHC 35.1 30.0 - 36.5 g/dL    RDW 12.4 11.5 - 14.5 %    PLATELET 767 460 - 550 K/uL    NEUTROPHILS 48 32 - 75 %    LYMPHOCYTES 44 12 - 49 %    MONOCYTES 7 5 - 13 %    EOSINOPHILS 1 0 - 7 %    BASOPHILS 0 0 - 1 %    ABS. NEUTROPHILS 3.3 1.8 - 8.0 K/UL    ABS. LYMPHOCYTES 3.0 0.8 - 3.5 K/UL    ABS. MONOCYTES 0.4 0.0 - 1.0 K/UL    ABS. EOSINOPHILS 0.1 0.0 - 0.4 K/UL    ABS.  BASOPHILS 0.0 0.0 - 0.1 K/UL   MAGNESIUM    Collection Time: 01/05/18  4:13 AM   Result Value Ref Range    Magnesium 1.9 1.6 - 2.4 mg/dL   PHOSPHORUS    Collection Time: 01/05/18  4:13 AM   Result Value Ref Range    Phosphorus 4.9 (H) 2.6 - 4.7 MG/DL   DRUG SCREEN, URINE    Collection Time: 01/05/18  4:56 AM   Result Value Ref Range    AMPHETAMINES NEGATIVE  NEG      BARBITURATES NEGATIVE  NEG      BENZODIAZEPINES NEGATIVE  NEG      COCAINE NEGATIVE  NEG      METHADONE NEGATIVE  NEG      OPIATES POSITIVE (A) NEG      PCP(PHENCYCLIDINE) NEGATIVE  NEG      THC (TH-CANNABINOL) POSITIVE (A) NEG      Drug screen comment (NOTE)          Impression:     Principal Problem:    Traumatic hemorrhage of cerebrum (HCC) (1/5/2018)    Active Problems:    ICH (intracerebral hemorrhage) ([facility] [address] (1/5/2018)        Plan:     Acute left grade 2 AC separation - no acute surgical needs; remain in sling; NWB through the left arm for now; will need outpatient follow-up with shoulder specialist in about 2 weeks (Dr Edwin Keene)  NSAIDs/pain medications PRN  Remain in sling  Ice to shoulder    Dr Rafael Maldonado aware of patient and agrees with plan of care      Shannen Mathur PA-C  [address]

## 2018-01-05 NOTE — PROGRESS NOTES
CM reviewed chart and noted patient was transferred from [facility] ER to [address] [place] [address]. Patient was seen in the ER following a fall in his backyard, he sustained possible intracerebral hemorrhage, fracture of transverse process C7 and T1 and Grade 2 acromioclavicular joint separation. Patient cleared by neuro and Ortho recommended a sling, NWB and an outpatient follow up in 2 weeks with Dr. Virginia Andrade. CM discussed patient with Josefina Banegas, Hospitalist, he said patient may be discharged if cleared by Ortho. CM attempted to meet with patient to discuss health  insurance status and discharge planning. Patient was alert, spoke with a very soft voice but did not wish to continue the conversation as he remained quiet. CM will follow up with patient later today. Guru Hernandez MSA, RN,CRM. 12:15 pm. CM called Dr. Meli Ordonez's office [phone] to schedule an appt for 2 weeks as ordered. Appt given for 1/22/18 at 9:50 am to arrive at 9:20 am  and was told that the office visit will cost $150.00 as patient has no health insurance. Patient was informed of office fee, date, time of arrival, address and telephone number of the office. Patient also told to call Crow Petty at [phone] for status of his Care Card application. Patient understood instructions. CM left a VM message for Kim Delarosa [address] [place] [address] Specialist to make an appointment for a PCP visit with the [facility]. Of note, patient said he had completed the Care Card application at [facility] and was told that it would take a while before he receives his card. Guru Hernandez MSA, RN,CRM. Care Management Interventions  PCP Verified by CM: Yes (No PCP, Self pay)  Mode of Transport at Discharge:  Other (see comment)  Transition of Care Consult (CM Consult): Discharge Planning  MyChart Signup: No  Discharge Durable Medical Equipment: No  Health Maintenance Reviewed: Yes  Physical Therapy Consult: No  Occupational Therapy Consult: No  Speech Therapy Consult: No  Current Support Network: Lives Alone  Plan discussed with Pt/Family/Caregiver: Yes  Discharge Location  Discharge Placement: Home

## 2018-01-05 NOTE — PROGRESS NOTES
2130. Pt arrives to ICU #7 A&Ox4; Neuro intact; denies any N/V; SOB; he does report pain in his back when he exhales and constant left shoulder pain; pt got 150 mcg Fentanyl en route; back pain was addressed at [facility]; VSS; pt denies having to void; instructed not to get OOB unassisted; he agrees to use his call orellana.    2200. Dr. Juan Antonio Simpson paged and notified of pt arrival; pt family at the bedside-info code provided; pt resting with eyes closed    2245. Dr. Cheyenne Bosworth at the bedside to see patient    18. Pt awake and requesting pain medication; Dr. Cheyenne Bosworth paged. 2330. Pt sleeping with periods of apnea; 02 sats-87%; 2 liters applied; pt will not receive pain medication at this time to prevent over sedation. 0115. Pt awake and grimacing; VSS; PRN Norco given for 7/10 left shoulder pain; pt has not voided since admission and denies urge; MIVF started    0215. Pt sleeping    0400. Pt requesting more pain medication; unable to give more Norco; MD called to inquire if dose could be increased to 7.5-325 mg and MD declined; Ice pack applied to left shoulder and pt advised of MD response; Neuro remains. 0500. Pt traveled to CT via wheelchair- trip uneventful; PRN Norco given for shoulder pain 8/10; urine drug screen collected and sent as ordered. 5462. Radilogist called and informed of CT/Neck-Fractures of left transverse processes C7 and T1, and left second rib but No evidence of cervical instability; Called ordering MD who advised Dr. Josue De Luna needs to be notified. 6887. Dr. Josue De Luna paged to notify of CT results    0730.  Report given to oncoming RN

## 2018-01-05 NOTE — H&P
[address] [facility]  ACUTE CARE HISTORY AND PHYSICAL    Rekha Kemp.  MR#: [id number]  : 1980  ACCOUNT #: [de-identified]   DATE OF SERVICE: 2018    PRIMARY CARE PHYSICIAN:  None. SOURCE OF INFORMATION:  Patient. CHIEF COMPLAINT:  Fall. HISTORY OF PRESENT ILLNESS:  This is a 77-year-old man without any significant past medical history, who was in his usual state of health until the day of presentation to the emergency room, when the patient fell in his backyard. The patient slipped and fell. The fall was accidental.  There was no dizziness, chest pain or blurring of vision before the fall. Patient sustained injuries to the right side of his head and neck, as well as his left shoulder. Patient denies loss of consciousness. Patient was able to ambulate without any problem following the fall. He took Aleve for the associated pain. Patient was trying to sleep, but could not because of the pain. He went to [place] [facility]. When the patient arrived to the emergency room, CT scan of the head was obtained. The CT scan shows possible intracerebral hemorrhage. Patient was discussed with the neurosurgeon on-call at [facility].  The neurosurgeon advised admission to the hospitalist service. Patient was directly accepted from [place] to the hospitalist service here at [facility] for admission. Patient denies fever, rigors and chills. PAST MEDICAL HISTORY:  Not significant. PAST SURGICAL HISTORY:  This is significant for tooth extraction. ALLERGIES:  NO KNOWN DRUG ALLERGIES. MEDICATIONS:  Patient is not on any medication at home. FAMILY HISTORY:  This was reviewed; it is not pertinent to present illness. No family history of intracranial hemorrhage. SOCIAL HISTORY:  Patient smokes about 1/2 pack of cigarettes daily. Patient admits to social consumption of alcohol.     REVIEW OF SYSTEMS  HEAD, EARS, EYES, NOSE AND THROAT:  This is positive for headache. No blurring of vision, no photophobia. RESPIRATORY:  No cough, no shortness of breath, no hemoptysis. CARDIOVASCULAR:  No chest pain, no orthopnea, no palpitations. GASTROINTESTINAL:  No nausea or vomiting, no diarrhea, no constipation. GENITOURINARY:  No dysuria, no urgency and no frequency. All other systems are reviewed, and they are negative. PHYSICAL EXAMINATION:  GENERAL APPEARANCE:  Patient appeared ill and in moderate distress. VITAL SIGNS:  Temperature 97.5, pulse 103, blood pressure 135/83, respiratory rate 19, oxygen saturation 97% on room air. HEENT:  Head normocephalic, atraumatic. Eyes:  Normal eye movement. No redness, no drainage, no discharge. Ears:  Normal external ears with no obvious drainage. Nose:  No deformity and drainage. Mouth and throat:  No visible oral lesion. NECK:  Supple; no JVD, no thyromegaly. CHEST:  Clear breath sounds: No wheezing, no crackles. HEART:  Normal S1, S2, regular. No clinically appreciable murmur. ABDOMEN:  Soft and nontender. Normal bowel sounds. CENTRAL NERVOUS SYSTEM:  Alert and oriented x3. No gross focal neurological deficit. EXTREMITIES:  No edema. Pulses 2+ bilaterally. MUSCULOSKELETAL:  Mild left shoulder tenderness. No obvious deformity. No loss of function. SKIN:  No active skin lesion seen on the exposed part of the body. PSYCHIATRIC:  Normal mood and affect. LYMPHATIC:  No cervical lymphadenopathy. DIAGNOSTIC DATA:  CT scan of the head without contrast shows possible intracerebral hemorrhage. Chest x-ray and a left shoulder x-ray show left shoulder AC separation. LABORATORY DATA:  Point of care chemistries:  Glucose 101, sodium 142, potassium 4.1, chloride 106, CO2 of 25, creatinine 0.9. Hematology:  WBC 10.4, hemoglobin 15.3, hematocrit 42.2, platelets are 347. ASSESSMENT:  1. Suspected intracranial hemorrhage.   2. Fall  3. Tobacco abuse. 4.  Left shoulder acromioclavicular separation. PLAN:  1. Suspected intracerebral hemorrhage. We will admit the patient for further evaluation and treatment. We will repeat a CT scan of the head, as advised by the neurosurgeon. 2.  Fall. We will obtain a CT scan of the cervical spine to rule out fracture, since the patient is complaining of neck pain. We will monitor the patient closely. 3.  Tobacco abuse. Patient advised to quit smoking. We will place the patient on Nicoderm patch. 4.  Left shoulder AC separation. We will try out conservative therapy, which will include pain control. Patient may require orthopedic consult if conservative therapy fails. 5.  Other issues. A.  Of course, the patient is FULL CODE.  B.  We will request for SCDs for DVT prophylaxis.       MD JORDAN Oliveros / JENNIFFER  D: 01/04/2018 22:43     T: 01/04/2018 23:25  JOB #: [id number]

## 2018-01-05 NOTE — PROGRESS NOTES
Neurosurgery  Repeat head CT shows Stable nonspecific right cerebral abnormality, possibly a small  focus of hemorrhagic contusion and surrounding edema. Ct cspine shows fracture transverse process C7 and T1    Nothing further needs to be done with brain. Cspine - stable fractures, no treatment needed, will heal on their own. May go when shoulder cleared by ortho.     No f/u needed from me